# Patient Record
Sex: FEMALE | Race: BLACK OR AFRICAN AMERICAN | NOT HISPANIC OR LATINO | URBAN - METROPOLITAN AREA
[De-identification: names, ages, dates, MRNs, and addresses within clinical notes are randomized per-mention and may not be internally consistent; named-entity substitution may affect disease eponyms.]

---

## 2017-05-27 ENCOUNTER — EMERGENCY (EMERGENCY)
Facility: HOSPITAL | Age: 47
LOS: 1 days | Discharge: HOME | End: 2017-05-27
Attending: EMERGENCY MEDICINE | Admitting: EMERGENCY MEDICINE
Payer: COMMERCIAL

## 2017-05-27 VITALS
TEMPERATURE: 98 F | DIASTOLIC BLOOD PRESSURE: 64 MMHG | SYSTOLIC BLOOD PRESSURE: 105 MMHG | HEART RATE: 63 BPM | RESPIRATION RATE: 20 BRPM | OXYGEN SATURATION: 99 %

## 2017-05-27 DIAGNOSIS — Z88.6 ALLERGY STATUS TO ANALGESIC AGENT: ICD-10-CM

## 2017-05-27 DIAGNOSIS — B35.0 TINEA BARBAE AND TINEA CAPITIS: ICD-10-CM

## 2017-05-27 DIAGNOSIS — L02.811 CUTANEOUS ABSCESS OF HEAD [ANY PART, EXCEPT FACE]: ICD-10-CM

## 2017-05-27 DIAGNOSIS — Z79.2 LONG TERM (CURRENT) USE OF ANTIBIOTICS: ICD-10-CM

## 2017-05-27 PROCEDURE — 99283 EMERGENCY DEPT VISIT LOW MDM: CPT

## 2017-05-27 RX ORDER — ULTRAMICROSIZE GRISEOFULVIN 250 MG/1
1 TABLET ORAL
Qty: 30 | Refills: 0 | OUTPATIENT
Start: 2017-05-27 | End: 2017-06-26

## 2017-05-27 RX ADMIN — Medication 50 MILLIGRAM(S): at 18:47

## 2017-05-27 NOTE — ED PROVIDER NOTE - PLAN OF CARE
Take griseofulvin (anti-fungal) and steroids as prescribed. Schedule an appointment with the infectious disease clinic (information attached) next week. You will need monitoring of your liver function tests when on the griseofulvin. Return to an ER for worsening symptoms or any other concerns.

## 2017-05-27 NOTE — ED PROVIDER NOTE - CARE PLAN
Principal Discharge DX:	Veronica  Instructions for follow-up, activity and diet:	Take griseofulvin (anti-fungal) and steroids as prescribed. Schedule an appointment with the infectious disease clinic (information attached) next week. You will need monitoring of your liver function tests when on the griseofulvin. Return to an ER for worsening symptoms or any other concerns.

## 2017-05-27 NOTE — ED ADULT NURSE NOTE - OBJECTIVE STATEMENT
45 y/o female came is with c/o painful abscess on top of head and redness/tearing in eyes. Pt states her "allergies are acting up since yesterday" and abscess started a week ago. She states she felt the need to "pull out the hairs on the abscess because it hurt so bad. Pt. A&Ox4, denies chest pain/SOB. Denies n/v/d. Scabs noted on top of head with hair missing in area of abscess. Peripheral pulses strong and normal baseline sensation present x4. Safety and comfort measures maintained.

## 2017-05-27 NOTE — ED PROVIDER NOTE - OBJECTIVE STATEMENT
46yF presents with several days of worsening scalp "abscess". Irritation/itch over scalp, has been pulling out hair around area. Noticed pustules over area of discomfort. No fever/chills. No drainage. 46yF presents with several days of worsening scalp "abscess". Irritation/itch over scalp, has been pulling out hair around area. Noticed pustules over area of discomfort. No fever/chills. No drainage.    Attendinyo female presents with burning/itching on the scalp for about 1 week.  Has been pulling out her hair because of discomfort.  Has pain and itchiness.

## 2017-09-14 ENCOUNTER — EMERGENCY (EMERGENCY)
Facility: HOSPITAL | Age: 47
LOS: 1 days | Discharge: HOME | End: 2017-09-14
Attending: EMERGENCY MEDICINE | Admitting: EMERGENCY MEDICINE
Payer: COMMERCIAL

## 2017-09-14 VITALS
TEMPERATURE: 99 F | RESPIRATION RATE: 16 BRPM | SYSTOLIC BLOOD PRESSURE: 112 MMHG | WEIGHT: 132.06 LBS | DIASTOLIC BLOOD PRESSURE: 70 MMHG | OXYGEN SATURATION: 100 % | HEART RATE: 72 BPM | HEIGHT: 66 IN

## 2017-09-14 PROCEDURE — 99282 EMERGENCY DEPT VISIT SF MDM: CPT

## 2017-09-14 NOTE — ED PROVIDER NOTE - ATTENDING CONTRIBUTION TO CARE
pt is a 47 y/o female with l buttock abscess noted with purulent drainage the past few days, induration and flocuence to extend incision, break up loculations, packed, wound care follow up, abx.

## 2017-09-14 NOTE — ED ADULT NURSE NOTE - DISCHARGE TEACHING
tylenol, keep packing in place, f/u with pcp, return for uncontrolled fevers, worsening pain, any concerns

## 2017-09-14 NOTE — ED PROVIDER NOTE - OBJECTIVE STATEMENT
46 y.o. female no PMHx p/w left buttock abscess. Symptoms have been present since Sunday, progressively worsening. A few days ago the abscess opened and did drain some pus but it still feels like it is getting bigger. No fevers, chills. Does have a history of multiple abscesses.

## 2017-09-14 NOTE — ED PROVIDER NOTE - CARE PLAN
Principal Discharge DX:	Abscess  Instructions for follow-up, activity and diet:	1. You may take Tylenol 650mg every 6 hours as needed for pain. Keep the wound packing in place to encourage drainage.   2. Follow up with your Primary Care Physician as soon as possible for further evaluation. Return to the Emergency Department in 48 hours for removal of the wound packing.   3. Return to the Emergency Department for any concerning symptoms.

## 2017-09-14 NOTE — ED PROVIDER NOTE - PLAN OF CARE
1. You may take Tylenol 650mg every 6 hours as needed for pain. Keep the wound packing in place to encourage drainage.   2. Follow up with your Primary Care Physician as soon as possible for further evaluation. Return to the Emergency Department in 48 hours for removal of the wound packing.   3. Return to the Emergency Department for any concerning symptoms.

## 2017-09-16 ENCOUNTER — EMERGENCY (EMERGENCY)
Facility: HOSPITAL | Age: 47
LOS: 1 days | Discharge: HOME | End: 2017-09-16
Attending: EMERGENCY MEDICINE | Admitting: EMERGENCY MEDICINE
Payer: COMMERCIAL

## 2017-09-16 VITALS
OXYGEN SATURATION: 99 % | RESPIRATION RATE: 17 BRPM | HEART RATE: 62 BPM | WEIGHT: 134.92 LBS | SYSTOLIC BLOOD PRESSURE: 98 MMHG | TEMPERATURE: 98 F | DIASTOLIC BLOOD PRESSURE: 56 MMHG

## 2017-09-16 PROCEDURE — 99283 EMERGENCY DEPT VISIT LOW MDM: CPT

## 2017-09-16 RX ORDER — AZTREONAM 2 G
1 VIAL (EA) INJECTION
Qty: 14 | Refills: 0 | OUTPATIENT
Start: 2017-09-16 | End: 2017-09-23

## 2017-09-16 RX ADMIN — Medication 1 TABLET(S): at 19:22

## 2017-09-16 NOTE — ED PROVIDER NOTE - ATTENDING CONTRIBUTION TO CARE
Dr. Evans (Attending Physician)  I performed a history and physical exam of the patient and discussed their management with the advanced care provider. I reviewed the advanced care provider's note and agree with the documented findings and plan of care. My medical decision making and objective findings are found above.

## 2017-09-16 NOTE — ED PROVIDER NOTE - MEDICAL DECISION MAKING DETAILS
Dr. Evans (Attending Physician)  ho abscess now pw buttock abscess s/p drainage healing well, packing fell out, no surrounding erythema, no fluctuance, notes small pustule on abd wall as well.  Previous abscess to scalp. Will treat with bactrim for possible MRSA.

## 2017-09-16 NOTE — ED PROVIDER NOTE - OBJECTIVE STATEMENT
45yo female pt, no PMHx c/o wound check s/p I&D for left buttock abscess in ED 2days ago. Pt stated she removed the packing last night. She also noticed a small abscess on low abd area today. No antibiotic prescribed. Denies fever, chills or bodyaches. Denies CP/SOB/ABD pain or N/V/D.

## 2017-09-16 NOTE — ED PROVIDER NOTE - PHYSICAL EXAMINATION
NAD, VSS, Afebrile, + right buttock I&D open wound- tender, no packing, no active draining, No eryth or cellulitis. + a small 1x0.5cm firm abscess on low abdomen without cellulitis or fluctuating, abd soft, non tender, Neuro- intact.

## 2017-09-16 NOTE — ED ADULT NURSE NOTE - CHPI ED SYMPTOMS NEG
no chills/no bleeding/no inflammation/no drainage/no redness/no red streaks/no fever/no purulent drainage/no bleeding at site

## 2017-09-16 NOTE — ED ADULT NURSE NOTE - OBJECTIVE STATEMENT
46F comes to ED for wound check s/p abscess 2 days ago on left buttock. She noticed pain 1 week ago. States she has pain to area. States she keeps wound covered. She comes today with 3cm round wound under left buttock that is pink. No redness around wound/no warmth to area. Denies PMH. Denies daily meds. States she is not on antibiotics. Denies SOB/chest pain/fever/chills/N/V/D. Will continue to monitor.

## 2017-11-06 NOTE — ED ADULT NURSE NOTE - OBJECTIVE STATEMENT

## 2017-11-10 ENCOUNTER — APPOINTMENT (OUTPATIENT)
Dept: INTERNAL MEDICINE | Facility: CLINIC | Age: 47
End: 2017-11-10
Payer: COMMERCIAL

## 2017-11-10 VITALS
TEMPERATURE: 98.9 F | DIASTOLIC BLOOD PRESSURE: 70 MMHG | HEART RATE: 77 BPM | WEIGHT: 132 LBS | SYSTOLIC BLOOD PRESSURE: 90 MMHG | OXYGEN SATURATION: 99 % | BODY MASS INDEX: 22.53 KG/M2 | HEIGHT: 64 IN

## 2017-11-10 DIAGNOSIS — Z01.419 ENCOUNTER FOR GYNECOLOGICAL EXAMINATION (GENERAL) (ROUTINE) W/OUT ABNORMAL FINDINGS: ICD-10-CM

## 2017-11-10 DIAGNOSIS — Z87.898 PERSONAL HISTORY OF OTHER SPECIFIED CONDITIONS: ICD-10-CM

## 2017-11-10 DIAGNOSIS — L73.9 FOLLICULAR DISORDER, UNSPECIFIED: ICD-10-CM

## 2017-11-10 DIAGNOSIS — Z30.9 ENCOUNTER FOR CONTRACEPTIVE MANAGEMENT, UNSPECIFIED: ICD-10-CM

## 2017-11-10 DIAGNOSIS — Z00.00 ENCOUNTER FOR GENERAL ADULT MEDICAL EXAMINATION W/OUT ABNORMAL FINDINGS: ICD-10-CM

## 2017-11-10 PROCEDURE — 99386 PREV VISIT NEW AGE 40-64: CPT | Mod: 25

## 2017-11-10 PROCEDURE — 93000 ELECTROCARDIOGRAM COMPLETE: CPT

## 2017-11-10 RX ORDER — CLOBETASOL PROPIONATE 0.5 MG/ML
0.05 SOLUTION TOPICAL
Qty: 50 | Refills: 0 | Status: DISCONTINUED | COMMUNITY
Start: 2017-05-25

## 2017-11-10 RX ORDER — MUPIROCIN 20 MG/G
2 OINTMENT TOPICAL
Qty: 1 | Refills: 0 | Status: ACTIVE | COMMUNITY
Start: 2017-11-10 | End: 1900-01-01

## 2017-11-10 RX ORDER — KETOCONAZOLE 20.5 MG/ML
2 SHAMPOO, SUSPENSION TOPICAL
Qty: 120 | Refills: 0 | Status: DISCONTINUED | COMMUNITY
Start: 2017-05-25

## 2017-11-10 RX ORDER — SULFAMETHOXAZOLE AND TRIMETHOPRIM 800; 160 MG/1; MG/1
800-160 TABLET ORAL
Qty: 14 | Refills: 0 | Status: DISCONTINUED | COMMUNITY
Start: 2017-09-16

## 2018-11-03 NOTE — ED ADULT TRIAGE NOTE - HEIGHT IN INCHES
6
I have reviewed and confirmed nurses' notes for patient's medications, allergies, medical history, and surgical history.

## 2019-05-05 ENCOUNTER — EMERGENCY (EMERGENCY)
Facility: HOSPITAL | Age: 49
LOS: 1 days | Discharge: HOME | End: 2019-05-05
Attending: EMERGENCY MEDICINE
Payer: COMMERCIAL

## 2019-05-05 VITALS
WEIGHT: 130.07 LBS | RESPIRATION RATE: 17 BRPM | HEART RATE: 81 BPM | SYSTOLIC BLOOD PRESSURE: 119 MMHG | HEIGHT: 64 IN | TEMPERATURE: 98 F | OXYGEN SATURATION: 100 % | DIASTOLIC BLOOD PRESSURE: 78 MMHG

## 2019-05-05 VITALS
HEART RATE: 65 BPM | RESPIRATION RATE: 18 BRPM | DIASTOLIC BLOOD PRESSURE: 65 MMHG | OXYGEN SATURATION: 100 % | SYSTOLIC BLOOD PRESSURE: 117 MMHG

## 2019-05-05 LAB
ALBUMIN SERPL ELPH-MCNC: 4.4 G/DL — SIGNIFICANT CHANGE UP (ref 3.3–5)
ALP SERPL-CCNC: 80 U/L — SIGNIFICANT CHANGE UP (ref 40–120)
ALT FLD-CCNC: 30 U/L — SIGNIFICANT CHANGE UP (ref 10–45)
ANION GAP SERPL CALC-SCNC: 11 MMOL/L — SIGNIFICANT CHANGE UP (ref 5–17)
APPEARANCE UR: CLEAR — SIGNIFICANT CHANGE UP
APTT BLD: 27.8 SEC — SIGNIFICANT CHANGE UP (ref 27.5–36.3)
AST SERPL-CCNC: 25 U/L — SIGNIFICANT CHANGE UP (ref 10–40)
BACTERIA # UR AUTO: NEGATIVE — SIGNIFICANT CHANGE UP
BASOPHILS # BLD AUTO: 0 K/UL — SIGNIFICANT CHANGE UP (ref 0–0.2)
BASOPHILS NFR BLD AUTO: 0.2 % — SIGNIFICANT CHANGE UP (ref 0–2)
BILIRUB SERPL-MCNC: 0.1 MG/DL — LOW (ref 0.2–1.2)
BILIRUB UR-MCNC: NEGATIVE — SIGNIFICANT CHANGE UP
BUN SERPL-MCNC: 8 MG/DL — SIGNIFICANT CHANGE UP (ref 7–23)
CALCIUM SERPL-MCNC: 9.8 MG/DL — SIGNIFICANT CHANGE UP (ref 8.4–10.5)
CHLORIDE SERPL-SCNC: 101 MMOL/L — SIGNIFICANT CHANGE UP (ref 96–108)
CO2 SERPL-SCNC: 27 MMOL/L — SIGNIFICANT CHANGE UP (ref 22–31)
COLOR SPEC: COLORLESS — SIGNIFICANT CHANGE UP
CREAT SERPL-MCNC: 0.79 MG/DL — SIGNIFICANT CHANGE UP (ref 0.5–1.3)
DIFF PNL FLD: ABNORMAL
EOSINOPHIL # BLD AUTO: 0.1 K/UL — SIGNIFICANT CHANGE UP (ref 0–0.5)
EOSINOPHIL NFR BLD AUTO: 1.4 % — SIGNIFICANT CHANGE UP (ref 0–6)
EPI CELLS # UR: 2 /HPF — SIGNIFICANT CHANGE UP
GLUCOSE SERPL-MCNC: 92 MG/DL — SIGNIFICANT CHANGE UP (ref 70–99)
GLUCOSE UR QL: NEGATIVE — SIGNIFICANT CHANGE UP
HCG UR QL: NEGATIVE — SIGNIFICANT CHANGE UP
HCT VFR BLD CALC: 40.8 % — SIGNIFICANT CHANGE UP (ref 34.5–45)
HGB BLD-MCNC: 13.7 G/DL — SIGNIFICANT CHANGE UP (ref 11.5–15.5)
HYALINE CASTS # UR AUTO: 1 /LPF — SIGNIFICANT CHANGE UP (ref 0–2)
INR BLD: 0.97 RATIO — SIGNIFICANT CHANGE UP (ref 0.88–1.16)
KETONES UR-MCNC: NEGATIVE — SIGNIFICANT CHANGE UP
LEUKOCYTE ESTERASE UR-ACNC: ABNORMAL
LYMPHOCYTES # BLD AUTO: 1.3 K/UL — SIGNIFICANT CHANGE UP (ref 1–3.3)
LYMPHOCYTES # BLD AUTO: 13.3 % — SIGNIFICANT CHANGE UP (ref 13–44)
MCHC RBC-ENTMCNC: 28.9 PG — SIGNIFICANT CHANGE UP (ref 27–34)
MCHC RBC-ENTMCNC: 33.6 GM/DL — SIGNIFICANT CHANGE UP (ref 32–36)
MCV RBC AUTO: 86.2 FL — SIGNIFICANT CHANGE UP (ref 80–100)
MONOCYTES # BLD AUTO: 0.7 K/UL — SIGNIFICANT CHANGE UP (ref 0–0.9)
MONOCYTES NFR BLD AUTO: 7.2 % — SIGNIFICANT CHANGE UP (ref 2–14)
NEUTROPHILS # BLD AUTO: 7.5 K/UL — HIGH (ref 1.8–7.4)
NEUTROPHILS NFR BLD AUTO: 78 % — HIGH (ref 43–77)
NITRITE UR-MCNC: NEGATIVE — SIGNIFICANT CHANGE UP
PH UR: 6 — SIGNIFICANT CHANGE UP (ref 5–8)
PLATELET # BLD AUTO: 298 K/UL — SIGNIFICANT CHANGE UP (ref 150–400)
POTASSIUM SERPL-MCNC: 3.8 MMOL/L — SIGNIFICANT CHANGE UP (ref 3.5–5.3)
POTASSIUM SERPL-SCNC: 3.8 MMOL/L — SIGNIFICANT CHANGE UP (ref 3.5–5.3)
PROT SERPL-MCNC: 7.7 G/DL — SIGNIFICANT CHANGE UP (ref 6–8.3)
PROT UR-MCNC: NEGATIVE — SIGNIFICANT CHANGE UP
PROTHROM AB SERPL-ACNC: 11 SEC — SIGNIFICANT CHANGE UP (ref 10–12.9)
RBC # BLD: 4.73 M/UL — SIGNIFICANT CHANGE UP (ref 3.8–5.2)
RBC # FLD: 12.2 % — SIGNIFICANT CHANGE UP (ref 10.3–14.5)
RBC CASTS # UR COMP ASSIST: 0 /HPF — SIGNIFICANT CHANGE UP (ref 0–4)
SODIUM SERPL-SCNC: 139 MMOL/L — SIGNIFICANT CHANGE UP (ref 135–145)
SP GR SPEC: 1 — LOW (ref 1.01–1.02)
TROPONIN T, HIGH SENSITIVITY RESULT: <6 NG/L — SIGNIFICANT CHANGE UP (ref 0–51)
UROBILINOGEN FLD QL: NEGATIVE — SIGNIFICANT CHANGE UP
WBC # BLD: 9.6 K/UL — SIGNIFICANT CHANGE UP (ref 3.8–10.5)
WBC # FLD AUTO: 9.6 K/UL — SIGNIFICANT CHANGE UP (ref 3.8–10.5)
WBC UR QL: 4 /HPF — SIGNIFICANT CHANGE UP (ref 0–5)

## 2019-05-05 PROCEDURE — 85027 COMPLETE CBC AUTOMATED: CPT

## 2019-05-05 PROCEDURE — 81025 URINE PREGNANCY TEST: CPT

## 2019-05-05 PROCEDURE — 80053 COMPREHEN METABOLIC PANEL: CPT

## 2019-05-05 PROCEDURE — 93005 ELECTROCARDIOGRAM TRACING: CPT

## 2019-05-05 PROCEDURE — 81001 URINALYSIS AUTO W/SCOPE: CPT

## 2019-05-05 PROCEDURE — 71045 X-RAY EXAM CHEST 1 VIEW: CPT

## 2019-05-05 PROCEDURE — 85730 THROMBOPLASTIN TIME PARTIAL: CPT

## 2019-05-05 PROCEDURE — 71045 X-RAY EXAM CHEST 1 VIEW: CPT | Mod: 26

## 2019-05-05 PROCEDURE — 84484 ASSAY OF TROPONIN QUANT: CPT

## 2019-05-05 PROCEDURE — 93010 ELECTROCARDIOGRAM REPORT: CPT

## 2019-05-05 PROCEDURE — 99284 EMERGENCY DEPT VISIT MOD MDM: CPT

## 2019-05-05 PROCEDURE — 85610 PROTHROMBIN TIME: CPT

## 2019-05-05 PROCEDURE — 99285 EMERGENCY DEPT VISIT HI MDM: CPT | Mod: 25

## 2019-05-05 RX ORDER — OXYCODONE HYDROCHLORIDE 5 MG/1
1 TABLET ORAL
Qty: 10 | Refills: 0 | OUTPATIENT
Start: 2019-05-05

## 2019-05-05 RX ORDER — DIAZEPAM 5 MG
1 TABLET ORAL
Qty: 10 | Refills: 0 | OUTPATIENT
Start: 2019-05-05

## 2019-05-05 RX ORDER — SODIUM CHLORIDE 9 MG/ML
1000 INJECTION INTRAMUSCULAR; INTRAVENOUS; SUBCUTANEOUS ONCE
Qty: 0 | Refills: 0 | Status: COMPLETED | OUTPATIENT
Start: 2019-05-05 | End: 2019-05-05

## 2019-05-05 RX ORDER — DIAZEPAM 5 MG
5 TABLET ORAL ONCE
Qty: 0 | Refills: 0 | Status: DISCONTINUED | OUTPATIENT
Start: 2019-05-05 | End: 2019-05-05

## 2019-05-05 RX ORDER — OXYCODONE HYDROCHLORIDE 5 MG/1
5 TABLET ORAL ONCE
Qty: 0 | Refills: 0 | Status: DISCONTINUED | OUTPATIENT
Start: 2019-05-05 | End: 2019-05-05

## 2019-05-05 RX ORDER — LIDOCAINE 4 G/100G
1 CREAM TOPICAL ONCE
Qty: 0 | Refills: 0 | Status: COMPLETED | OUTPATIENT
Start: 2019-05-05 | End: 2019-05-05

## 2019-05-05 RX ORDER — ACETAMINOPHEN 500 MG
975 TABLET ORAL ONCE
Qty: 0 | Refills: 0 | Status: COMPLETED | OUTPATIENT
Start: 2019-05-05 | End: 2019-05-05

## 2019-05-05 RX ADMIN — LIDOCAINE 1 PATCH: 4 CREAM TOPICAL at 16:02

## 2019-05-05 RX ADMIN — Medication 975 MILLIGRAM(S): at 16:01

## 2019-05-05 RX ADMIN — Medication 5 MILLIGRAM(S): at 16:02

## 2019-05-05 RX ADMIN — OXYCODONE HYDROCHLORIDE 5 MILLIGRAM(S): 5 TABLET ORAL at 16:22

## 2019-05-05 RX ADMIN — SODIUM CHLORIDE 1000 MILLILITER(S): 9 INJECTION INTRAMUSCULAR; INTRAVENOUS; SUBCUTANEOUS at 16:23

## 2019-05-05 NOTE — ED ADULT NURSE NOTE - OBJECTIVE STATEMENT
Pt presents for eval of mid to low back pain, located to left of spine.  She states pain radiates down buttock to lateral aspect f her left leg.  She denies urinary or bowel incontinence, but then stated she had an episode earlier today in which she became very hot, passed out and did become incontinent of urine.  She has not taken any meds as yet for her pain.

## 2019-05-05 NOTE — ED PROVIDER NOTE - OBJECTIVE STATEMENT
49yo female pt, no PMHx, ambulatory c/o left flank pain for 3days and syncopal episode this 12MD. Pt stated she's had left sided flank pain since 3days ago and described pain as spasm pain, worsening pain with movement. Pt did not take any pain medications. She also reported she passed out while sitting at 12 MD with self urination. She vomited once after syncope and denied any injury. Denies previous syncopal episode or back pain. Denies palpitations, CP or SOB. Denies radiating pain, sensory changes or weakness to extremities. Denies ABD pain. Denies headache, dizziness or visual changes. Denies urinary frequency or urgency. 49yo female pt, no PMHx, ambulatory c/o left flank pain for 3days and syncopal episode this 12MD. Pt stated she's had left sided flank pain since 3days ago and described pain as spasm pain, worsening pain with movement. Pt did not take any pain medications. She also reported she passed out while sitting at 12 MD with self urination. She vomited once after syncope and denied any injury. Denies previous syncopal episode or back pain. Denies palpitations, CP or SOB. Denies radiating pain, sensory changes or weakness to extremities. Denies ABD pain. Denies headache, dizziness or visual changes. Denies urinary frequency or urgency.       Attending note. Patient was seen in the fast track room #2. Agree with the above. Patient is complaining of severe left flank pain for the last 3 days which became much more severe today. When pain was severe today patient became lightheaded, and dizzy, sweaty, pale and had a syncopal episode which lasted approximately 1 minute according to her . She denies any numbness or paresthesia. She denies any urinary symptoms. She has no bowel pain. Pain is sharp and is exacerbated by movement. Patient did not take any medications at home for the last 3 days. Patient denies any past medical history and takes no medications daily. She reports allergy to Motrin with eye swelling.

## 2019-05-05 NOTE — ED PROVIDER NOTE - PHYSICAL EXAMINATION
NAD, VSS, Afebrile, + PERRL with full EOMs, No spinal mid tender. + Left sided flank tender with CVA tenderness. No sciatica tender. Lungs clear. ABD soft, non tender. Neuro- intact. NAD, VSS, Afebrile, + PERRL with full EOMs, No spinal mid tender. + Left sided flank tender with CVA tenderness. No sciatica tender. Lungs clear. ABD soft, non tender. Neuro- intact.       Attending note. Patient is alert and severe pain. Patient has tenderness in the left flank and left paralumbar area. There is no midline tenderness. There is no right CVA tenderness. Abdomen is soft and nontender. Hip and pelvis are nontender. Any movement causes the patient to have severe pain. There are no skin lesions or rashes on the back. DTRs are +2/4 equal and symmetrical without clonus. Any movement of the lower extremity causes severe pain in the back, so unable to assess straight leg raise. There is no leg edema or calf tenderness.  Sensation is intact and normal. Distal pulses are intact and normal.

## 2019-05-05 NOTE — ED PROVIDER NOTE - CLINICAL SUMMARY MEDICAL DECISION MAKING FREE TEXT BOX
Attending note. Acute exacerbation of left flank pain today after onset 3 days ago. Not likely to be stone or pyelonephritis. Syncopal event likely vagal response due to pain. Urinalysis, EKG, labs, analgesia and reassess.

## 2019-05-05 NOTE — ED PROVIDER NOTE - NSFOLLOWUPINSTRUCTIONS_ED_ALL_ED_FT
Hydrate.  No heavy lifting or strain your back.  Tylenol 500mg or 650mg every 6hours for pain as needed.  Oxycodone 1 tablet every 6hours for severe pain with cautions of drowsiness and constipation.  Stool softener as needed.  Valium 1 tablet every 8hours for muscle spasm pain with a caution of drowsiness, NO DRIVE or DRINK Alcohol with Valium.  Follow up with your primary Dr. for reevaluation in 2days, call tomorrow for an appointment.  Follow up with cardiology clinic, [phone], for syncopal episode in 2days, call tomorrow for an appointment.  Return for any concerns or worsening symptoms.

## 2019-07-24 NOTE — ED PROVIDER NOTE - NSFOLLOWUPCLINICS_GEN_ALL_ED_FT
Normal rate, regular rhythm.  Heart sounds S1, S2.  No murmurs, rubs or gallops. [facility]  Cardiology  [address] [place] [id number]  Phone: [phone]  Fax:   Follow Up Time:

## 2020-02-21 ENCOUNTER — RESULT REVIEW (OUTPATIENT)
Age: 50
End: 2020-02-21

## 2020-11-04 NOTE — ED ADULT NURSE NOTE - NS ED NURSE LEVEL OF CONSCIOUSNESS MENTAL STATUS
Awake/Alert Azathioprine Counseling:  I discussed with the patient the risks of azathioprine including but not limited to myelosuppression, immunosuppression, hepatotoxicity, lymphoma, and infections.  The patient understands that monitoring is required including baseline LFTs, Creatinine, possible TPMP genotyping and weekly CBCs for the first month and then every 2 weeks thereafter.  The patient verbalized understanding of the proper use and possible adverse effects of azathioprine.  All of the patient's questions and concerns were addressed.

## 2021-02-09 ENCOUNTER — RESULT REVIEW (OUTPATIENT)
Age: 51
End: 2021-02-09

## 2021-03-18 ENCOUNTER — RESULT REVIEW (OUTPATIENT)
Age: 51
End: 2021-03-18

## 2022-01-14 ENCOUNTER — APPOINTMENT (OUTPATIENT)
Dept: GASTROENTEROLOGY | Facility: CLINIC | Age: 52
End: 2022-01-14

## 2022-02-17 ENCOUNTER — RESULT REVIEW (OUTPATIENT)
Age: 52
End: 2022-02-17

## 2022-11-11 ENCOUNTER — EMERGENCY (EMERGENCY)
Facility: HOSPITAL | Age: 52
LOS: 1 days | Discharge: HOME | End: 2022-11-11
Attending: STUDENT IN AN ORGANIZED HEALTH CARE EDUCATION/TRAINING PROGRAM
Payer: COMMERCIAL

## 2022-11-11 VITALS
WEIGHT: 130.07 LBS | HEIGHT: 66 IN | HEART RATE: 71 BPM | TEMPERATURE: 98 F | DIASTOLIC BLOOD PRESSURE: 67 MMHG | SYSTOLIC BLOOD PRESSURE: 110 MMHG | RESPIRATION RATE: 16 BRPM | OXYGEN SATURATION: 99 %

## 2022-11-11 VITALS
SYSTOLIC BLOOD PRESSURE: 125 MMHG | OXYGEN SATURATION: 99 % | RESPIRATION RATE: 18 BRPM | DIASTOLIC BLOOD PRESSURE: 73 MMHG | HEART RATE: 68 BPM

## 2022-11-11 PROCEDURE — 73564 X-RAY EXAM KNEE 4 OR MORE: CPT

## 2022-11-11 PROCEDURE — 73564 X-RAY EXAM KNEE 4 OR MORE: CPT | Mod: 26,LT

## 2022-11-11 PROCEDURE — 99283 EMERGENCY DEPT VISIT LOW MDM: CPT

## 2022-11-11 PROCEDURE — 99283 EMERGENCY DEPT VISIT LOW MDM: CPT | Mod: 25

## 2022-11-11 RX ORDER — ACETAMINOPHEN 500 MG
975 TABLET ORAL ONCE
Refills: 0 | Status: COMPLETED | OUTPATIENT
Start: 2022-11-11 | End: 2022-11-11

## 2022-11-11 RX ORDER — LIDOCAINE 4 G/100G
1 CREAM TOPICAL ONCE
Refills: 0 | Status: COMPLETED | OUTPATIENT
Start: 2022-11-11 | End: 2022-11-11

## 2022-11-11 RX ADMIN — LIDOCAINE 1 PATCH: 4 CREAM TOPICAL at 22:00

## 2022-11-11 RX ADMIN — Medication 975 MILLIGRAM(S): at 21:59

## 2022-11-11 NOTE — ED PROVIDER NOTE - NSFOLLOWUPINSTRUCTIONS_ED_ALL_ED_FT
We evaluated you in the emergency room and it appears that you have knee pain.      We recommend that you rest, ice and take tylenol(650 mg up to three times a day).    After 48 hours please use heat on the area that is hurting.     Please follow up with sports medicine in the next 3-5 days for you knee. Keep the acewrap on as needed for your pain.     If you still have persistent pain after 5-7 days after the injury please be re-evaluated as there could be a more severe diagnosis. If you develop numbness, weakness, change in color of your extremities (turn blue or white), worsening pain please seek immediate medical care for repeat evaluation.

## 2022-11-11 NOTE — ED PROVIDER NOTE - NSFOLLOWUPCLINICS_GEN_ALL_ED_FT
[facility]  Sports Medicine  [address] [place] [address]  Phone: [phone]  Fax:

## 2022-11-11 NOTE — ED PROVIDER NOTE - OBJECTIVE STATEMENT
52 yo F w/ 3 days of acute onset L posterior knee pain w/ swelling in the setting of chronic lower back pain (since 2020). 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 She describes the knee pain as dull, constant pain w/ posterior knee swelling that makes it difficult to bend her knee. She has not taken any medications for the pain, but received Tylenol 975 mg PO at 2038 in the waiting area. She has not sought any evaluation/treatment for her back pain before.

## 2022-11-11 NOTE — ED ADULT NURSE NOTE - OBJECTIVE STATEMENT
51y Female presents to the ED c/o lower back pain. Pt denies injury/ trauma to the site. pt states she has been having back pain x2 days. pt states she has not seen a doctor. 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 Pt is A&Ox4, and ambulatory.

## 2022-11-11 NOTE — ED PROVIDER NOTE - PHYSICAL EXAMINATION
GENERAL: no acute distress, mesomorphic body habitus  HEENT: atraumatic, normocephalic, vision grossly intact,  EOMI, no conjunctivitis or discharge, hearing grossly intact, clear auditory canal, no nasal discharge or epistaxis, clear pharynx, uvula midline  CV: regular rate, normal rhythm, normal S1/S2, no murmurs/rubs, no cyanosis, 2+ peripheral pulses in b/l U/L extremities, cap refill < 2 seconds  PULM: normal work of breathing,clear breath sounds in b/l upper/lower lung fields, no crackles/rales/rhonchi/wheezing  GI: soft/non-tender/nondistended abdomen, no guarding or rebound tenderness, no palpable masses  NEURO: A&Ox4, follows commands, normal speech, no focal motor or sensory deficits  MSK/EXT: slight L lateral and posterior knee effusions w/ no TTP/erythema/warmth, slight pain w/ Nancy's maneuver (internal rotation), ranging all extremities with no appreciable loss of ROM, no peripheral edema, no calf tenderness/swelling  SKIN: warm, dry, and intact, no rashes  PSYCH: appropriate mood and affect

## 2022-11-11 NOTE — ED PROVIDER NOTE - CLINICAL SUMMARY MEDICAL DECISION MAKING FREE TEXT BOX
52 yo F w/ 3 days of acute onset L posterior knee pain w/ swelling in the setting of chronic lower back pain (since 2020). Physical exam is remarkable for slight L lateral and posterior knee effusions w/ no TTP/erythema/warmth, slight pain w/ Nancy's maneuver (internal rotation). Concern for knee effusions. Low clinical concern for septic joint (no erythema, warmth), DVT (calves equal b/l), and compartment syndrome (soft compartments). Plan for clinical exam, pain control, and R knee imaging. Dispo likely d/c home w/ ortho follow-up.

## 2022-11-11 NOTE — ED PROVIDER NOTE - RAPID ASSESSMENT
51y F presents to the ED c/o lower back pain that is non radiating and also L knee pain starting 3 days ago. Denies recent fall or trauma. Denies taking OTC pain medication. Denies numbness, tingling, weakness. Pt is well appearing in triage.    Lauren SHER (Scribe) have documented this rapid assessment note under the dictation of Cary Ngo (PA) which has been reviewed and affirmed to be accurate. Patient was seen as a QPA patient. 51y F presents to the ED c/o lower back pain that is non radiating and also L knee pain starting 3 days ago. Denies recent fall or trauma. Denies taking OTC pain medication. Denies numbness, tingling, weakness, pain w/ urination. Pt is well appearing in triage.    Lauren SHER (Katalina) have documented this rapid assessment note under the dictation of Cary Ngo (PA) which has been reviewed and affirmed to be accurate. Patient was seen as a QPA patient. 51y F presents to the ED c/o lower back pain that is non radiating and also L knee pain starting 3 days ago. Denies recent fall or trauma. Denies taking OTC pain medication. Denies numbness, tingling, weakness, pain w/ urination. Pt is well appearing in triage.    Lauren SHER (Zoilaiblucio) have documented this rapid assessment note under the dictation of Cary Ngo (PA) which has been reviewed and affirmed to be accurate. Patient was seen as a QPA patient.    Rapid assessment by Cary Ngo PA-C full eval to be performed in ED. Above documentation completed by tee above. I was present for and agree with documentation.   Cary Ngo PA-C

## 2022-11-11 NOTE — ED PROVIDER NOTE - ATTENDING CONTRIBUTION TO CARE
51 F here w/ L knee pain, pt ambulatory w/ steady gait, intact flexion and extension of the knee 51 F here w/ L knee pain for 3 days reports a lot of standing at work and states the pain is worse when going up and down stairs, she states that she has some swelling in the back of the leg as well, no pain medications. denies back pain,  pt ambulatory w/ steady gait, intact flexion and extension of the knee has swelling of the L knee along the anterior patella, 2+ DP pulse bialterall, no redness of the lower leg, pt w/ intake flexion and extension of the ankle joint, no swelling of the inner groin/thigh, no calf swelling, pt ambulatory no instability possible bakers cyst, plan for orthopedic follow up ace wrap weight bearing as tolerating low suspicion for vte, low suspicion for infection/cellulitis, xrays reviewed neg for trauma

## 2022-11-11 NOTE — ED PROVIDER NOTE - NS ED ROS FT
GENERAL: No fever or chills  EYES: No change in vision  HEENT: No trouble swallowing or speaking  CARDIAC: No chest pain  PULMONARY: No cough or SOB  GI: No abdominal pain, no nausea or no vomiting, no diarrhea or constipation  : No changes in urination  SKIN: No rashes  NEURO: No headache, no numbness  MSK: as per HPI  Otherwise as HPI or negative.

## 2022-11-11 NOTE — ED ADULT TRIAGE NOTE - SOURCE OF INFORMATION
09/19/22 1918   Patient Assessment/Suction   Level of Consciousness (AVPU) alert   Respiratory Effort Unlabored   Expansion/Accessory Muscles/Retractions no use of accessory muscles;no retractions   All Lung Fields Breath Sounds clear   PRE-TX-O2   O2 Device (Oxygen Therapy) room air   SpO2 98 %   Pulse Oximetry Type Intermittent   $ Pulse Oximetry - Multiple Charge Pulse Oximetry - Multiple   Pulse 76   Resp 19   Incentive Spirometer   $ Incentive Spirometer Charges done with encouragement   Administration (IS) proper technique demonstrated   Number of Repetitions (IS) 10   Level Incentive Spirometer (mL) 1500   Patient Tolerance (IS) good      Patient

## 2022-11-11 NOTE — ED PROVIDER NOTE - PATIENT PORTAL LINK FT
You can access the FollowMyHealth Patient Portal offered by [facility] by registering at the following website: [email or web address]. By joining [facility]’s FollowMyHealth portal, you will also be able to view your health information using other applications (apps) compatible with our system.

## 2025-05-29 NOTE — ED ADULT NURSE NOTE - NSIMPLEMENTINTERV_GEN_ALL_ED
49 Implemented All Fall with Harm Risk Interventions:  [place] to call system. Call bell, personal items and telephone within reach. Instruct patient to call for assistance. Room bathroom lighting operational. Non-slip footwear when patient is off stretcher. Physically safe environment: no spills, clutter or unnecessary equipment. Stretcher in lowest position, wheels locked, appropriate side rails in place. Provide visual cue, wrist band, yellow gown, etc. Monitor gait and stability. Monitor for mental status changes and reorient to person, place, and time. Review medications for side effects contributing to fall risk. Reinforce activity limits and safety measures with patient and family. Provide visual clues: red socks.